# Patient Record
Sex: MALE | Race: WHITE | NOT HISPANIC OR LATINO | Employment: UNEMPLOYED | URBAN - METROPOLITAN AREA
[De-identification: names, ages, dates, MRNs, and addresses within clinical notes are randomized per-mention and may not be internally consistent; named-entity substitution may affect disease eponyms.]

---

## 2023-01-01 ENCOUNTER — NURSING HOME VISIT (OUTPATIENT)
Dept: POST ACUTE CARE | Facility: EXTERNAL LOCATION | Age: 63
End: 2023-01-01
Payer: COMMERCIAL

## 2023-01-01 ENCOUNTER — HOSPITAL ENCOUNTER (EMERGENCY)
Facility: HOSPITAL | Age: 63
Discharge: NURSING, REHAB OR LONG-TERM CARE | End: 2023-09-30
Attending: EMERGENCY MEDICINE | Admitting: EMERGENCY MEDICINE
Payer: COMMERCIAL

## 2023-01-01 ENCOUNTER — HOSPITAL ENCOUNTER (OUTPATIENT)
Dept: DATA CONVERSION | Facility: HOSPITAL | Age: 63
Discharge: HOME | End: 2023-09-29
Payer: COMMERCIAL

## 2023-01-01 VITALS
RESPIRATION RATE: 18 BRPM | HEART RATE: 74 BPM | DIASTOLIC BLOOD PRESSURE: 68 MMHG | TEMPERATURE: 98 F | SYSTOLIC BLOOD PRESSURE: 116 MMHG

## 2023-01-01 DIAGNOSIS — D64.9 ANEMIA, UNSPECIFIED TYPE: ICD-10-CM

## 2023-01-01 DIAGNOSIS — D70.8 OTHER NEUTROPENIA (CMS-HCC): ICD-10-CM

## 2023-01-01 DIAGNOSIS — D61.818 PANCYTOPENIA (MULTI): ICD-10-CM

## 2023-01-01 DIAGNOSIS — E46 MALNUTRITION, UNSPECIFIED TYPE (MULTI): ICD-10-CM

## 2023-01-01 DIAGNOSIS — U07.1 COVID-19: Primary | ICD-10-CM

## 2023-01-01 DIAGNOSIS — N18.9 CHRONIC KIDNEY DISEASE, UNSPECIFIED CKD STAGE: ICD-10-CM

## 2023-01-01 DIAGNOSIS — Z91.81 AT RISK FOR FALLS: ICD-10-CM

## 2023-01-01 DIAGNOSIS — U07.1 COVID: ICD-10-CM

## 2023-01-01 DIAGNOSIS — N39.0 UTI (URINARY TRACT INFECTION) WITH PYURIA: ICD-10-CM

## 2023-01-01 DIAGNOSIS — G30.9 MODERATE ALZHEIMER'S DEMENTIA WITHOUT BEHAVIORAL DISTURBANCE, PSYCHOTIC DISTURBANCE, MOOD DISTURBANCE, OR ANXIETY, UNSPECIFIED TIMING OF DEMENTIA ONSET (MULTI): ICD-10-CM

## 2023-01-01 DIAGNOSIS — C20 RECTAL CARCINOMA (MULTI): ICD-10-CM

## 2023-01-01 DIAGNOSIS — N39.0 URINARY TRACT INFECTION WITHOUT HEMATURIA, SITE UNSPECIFIED: Primary | ICD-10-CM

## 2023-01-01 DIAGNOSIS — F02.B0 MODERATE ALZHEIMER'S DEMENTIA WITHOUT BEHAVIORAL DISTURBANCE, PSYCHOTIC DISTURBANCE, MOOD DISTURBANCE, OR ANXIETY, UNSPECIFIED TIMING OF DEMENTIA ONSET (MULTI): ICD-10-CM

## 2023-01-01 DIAGNOSIS — R53.1 ASTHENIA: ICD-10-CM

## 2023-01-01 DIAGNOSIS — N12 PYELONEPHRITIS: Primary | ICD-10-CM

## 2023-01-01 DIAGNOSIS — C18.9 MALIGNANT NEOPLASM OF COLON, UNSPECIFIED PART OF COLON (MULTI): ICD-10-CM

## 2023-01-01 DIAGNOSIS — A41.9 SEPSIS, DUE TO UNSPECIFIED ORGANISM, UNSPECIFIED WHETHER ACUTE ORGAN DYSFUNCTION PRESENT (MULTI): ICD-10-CM

## 2023-01-01 DIAGNOSIS — Z43.3 COLOSTOMY CARE (MULTI): ICD-10-CM

## 2023-01-01 DIAGNOSIS — R53.1 WEAKNESS: ICD-10-CM

## 2023-01-01 LAB
BACTERIA ISLT: NORMAL
BACTERIA UR QL AUTO: ABNORMAL
BILIRUB UR QL STRIP.AUTO: NEGATIVE
CEFEPIME SUSC ISLT: NORMAL
CEFTAZIDIME SUSC ISLT: NORMAL
CIPROFLOXACIN SUSC ISLT: NORMAL
CLARITY UR: ABNORMAL
COLOR UR: YELLOW
EPITH CASTS #/AREA UR COMP ASSIST: ABNORMAL /HPF
GENTAMICIN ISLT MLC: NORMAL
GLUCOSE UR STRIP.AUTO-MCNC: NEGATIVE MG/DL
HGB UR QL STRIP.AUTO: ABNORMAL /HPF (ref 0–3)
HGB UR QL: ABNORMAL
KETONES UR QL STRIP.AUTO: ABNORMAL
LEUKOCYTE ESTERASE UR QL STRIP.AUTO: ABNORMAL
LEVOFLOXACIN SUSC ISLT: NORMAL
MEROPENEM SUSC ISLT: NORMAL
MIC (SUSCEPTIBILITY): NORMAL
MICROSCOPIC (UA): ABNORMAL
NITRITE UR QL STRIP.AUTO: NEGATIVE
NOTE (COV): ABNORMAL
PH UR STRIP.AUTO: 8.5 [PH] (ref 4.6–8)
PIP+TAZO SUSC ISLT: NORMAL
PROT UR STRIP.AUTO-MCNC: >=500 MG/DL
SARS-COV-2 RNA RESP QL NAA+PROBE: ABNORMAL
SP GR UR STRIP.AUTO: 1.01 (ref 1–1.03)
SPECIMEN SOURCE (COV): ABNORMAL
URINE CULTURE: ABNORMAL
UROBILINOGEN UR QL STRIP.AUTO: NEGATIVE MG/DL (ref 0–1)
WBC #/AREA URNS AUTO: ABNORMAL /HPF (ref 0–3)

## 2023-01-01 PROCEDURE — 99284 EMERGENCY DEPT VISIT MOD MDM: CPT

## 2023-01-01 PROCEDURE — 99308 SBSQ NF CARE LOW MDM 20: CPT | Performed by: INTERNAL MEDICINE

## 2023-01-01 PROCEDURE — 99306 1ST NF CARE HIGH MDM 50: CPT | Performed by: INTERNAL MEDICINE

## 2023-01-01 PROCEDURE — 96374 THER/PROPH/DIAG INJ IV PUSH: CPT

## 2023-01-01 ASSESSMENT — ENCOUNTER SYMPTOMS
FEVER: 0
CHILLS: 0

## 2023-08-26 NOTE — LETTER
Patient: Octavio Santos  : 1960    Encounter Date: 2023    NAME OF THE PATIENT: Octavio Santos    YOB: 1960    PLACE OF SERVICE:  [facility]    This is new/initial history and physical.    HPI: Mr. Octavio Santos is a 63-year-old male with history of urinary tract infection with sepsis.  He suffers from bladder and colon carcinoma and is undergoing chemotherapy.  He suffers from weakness and deconditioning.  He is unable to care for himself.  He requires supportive care.    PAST MEDICAL HISTORY:  As per nursing home list.  CKD, colon cancer, bladder cancer, UTI, sepsis, weakness, fall risk, anemia, colovesical fistula, and rectal carcinoma.    CURRENT MEDICATIONS:  As per nursing home list.  Medications reviewed.    ALLERGIES:  As per nursing home list.  Codeine.    SOCIAL HISTORY:  As per nursing home list.  Positive history of tobacco abuse.    FAMILY HISTORY:  As per nursing home list.  The patient denies any history of cancer within his family.    REVIEW OF SYSTEMS:  All 12 systems reviewed and pertaining covered in history and physical, the rest are negative.  The patient denies any fevers, chills, or chest pain at this time.    PHYSICAL EXAMINATION  GENERAL: This is a well-developed, well-nourished male, lying in bed, appearing weak and lethargic.  VITAL SIGNS:  As recorded and reviewed from EMR.  Blood pressure 118/70.  Pulse 78.  Respirations 18.  Temperature 98.2.  EYES:  The patient's sclerae white.  The pupils were equal and round.  ENT:  The patient's external ears were normal and the otoscopic examination was negative.  NECK:  Supple.  There were no palpable masses.  Thyroid was not enlarged and there were no carotid bruits.  RESPIRATORY:  The patient had normal inspirations and expirations.  The breath sounds were equal bilaterally and clear to auscultation.  CARDIOVASCULAR:  The patient had S1 normal, split S2 without obvious rubs, clicks, or  murmurs.  GASTROINTESTINAL:  There was no hepatosplenomegaly.  There were no palpable masses and no inguinal nodes.  LYMPHATIC:  The patient had no axillary, groin, or lymphadenopathy.  MUSCULOSKELETAL:  The patient had normal gait.  The joints appeared to be normal without evidence of deformity.  Grossly the muscles had good range of motion and were without effusion.  EXTREMITIES:  There was no evidence of peripheral edema.  NEUROLOGIC:  The patient had normal cranial nerves.  The reflexes, sensory, and motor examination were grossly within normal limits.  PSYCHIATRIC: The patient had normal judgment and insight.   The patient was oriented to person, place, and time, and had no obvious mood defect including depression, anxiety, and/or agitation.    LAB WORK: Laboratory studies were reviewed.    ASSESSMENT AND PLAN:  Urinary tract infection, on antibiotic.  Recent sepsis, continue to monitor.  Colon with bladder cancer, on chemotherapy.  Weakness, on PT and OT.  Fall risk, on fall precaution.  Anemia, follow CBC.  CKD, monitor BMP.  Pancytopenia with neutropenic precautions.  Medication list reviewed and discussed with the family.  Hospital chart reviewed.      Electronically Signed By: Lennox Benson MD   9/26/23  6:23 PM

## 2023-09-02 NOTE — LETTER
Patient: Octavio Santos  : 1960    Encounter Date: 2023    NAME OF THE PATIENT: Octavio Santos    YOB: 1960    PLACE OF SERVICE:  [facility]    This is a subsequent visit.    HPI:  Mr. Octavio Santos is a 63-year-old male with recent history of urinary tract infection with pyelonephritis.  He has a history of bladder cancer and does have a colostomy.  He is unable to care for himself and requires supportive care.    PAST MEDICAL HISTORY:  As per nursing home list.  CKD, colon cancer, bladder cancer, UTI, sepsis, weakness, fall risk, anemia, colovesical fistula, and rectal carcinoma.    CURRENT MEDICATIONS:  As per nursing home list.  Medications reviewed.    ALLERGIES:  As per nursing home list.  Codeine.    SOCIAL HISTORY:  As per nursing home list.  Positive history of tobacco abuse.    FAMILY HISTORY:  As per nursing home list.  The patient denies any history of cancer within his family.    REVIEW OF SYSTEMS:  All 12 systems reviewed and pertaining covered in history and physical, the rest are negative.  The patient denies any fevers, chills, or chest pain at this time.    PHYSICAL EXAMINATION  GENERAL: This is a well-developed, well-nourished male, sitting in a chair, in no acute distress.  VITAL SIGNS:  As recorded and reviewed from EMR.  Blood pressure 120/72.  Pulse 76.  Respirations 16.  Temperature 97.8.  EYES:  The patient's sclerae white.  The pupils were equal and round.  ENT:  The patient's external ears were normal and the otoscopic examination was negative.  NECK:  Supple.  There were no palpable masses.  Thyroid was not enlarged and there were no carotid bruits.  RESPIRATORY:  The patient had normal inspirations and expirations.  The breath sounds were equal bilaterally and clear to auscultation.  CARDIOVASCULAR:  The patient had S1 normal, split S2 without obvious rubs, clicks, or murmurs.  GASTROINTESTINAL:  Abdomen shows colostomy in  place.  It is viable and functioning.  LYMPHATIC:  The patient had no axillary, groin, or lymphadenopathy.  MUSCULOSKELETAL:  The patient had normal gait.  The joints appeared to be normal without evidence of deformity.  Grossly the muscles had good range of motion and were without effusion.  EXTREMITIES:  There was no evidence of peripheral edema.  NEUROLOGIC:  The patient had normal cranial nerves.  The reflexes, sensory, and motor examination were grossly within normal limits.  PSYCHIATRIC: The patient had normal judgment and insight.  The patient was oriented to person, place, and time, and had no obvious mood defect including depression, anxiety, and/or agitation.    LAB WORK: Laboratory studies were reviewed from prior visit.    ASSESSMENT AND PLAN:  Pyelonephritis, on antibiotic.  Colostomy, on colostomy care.  Weakness, on PT/OT.  Fall risk, on fall precaution.  Recent COVID-19, stable.  Anemia, follow CBC.  CKD, monitor BMP.  Rectal carcinoma, continue to monitor.  Malnutrition, encouraged to eat.  Neutropenia, on reverse isolation.  Medication list reviewed and discussed with the family.  Hospital chart reviewed.      Electronically Signed By: Lennox Benson MD   9/26/23  6:22 PM

## 2023-09-26 PROBLEM — N39.0 URINARY TRACT INFECTION WITHOUT HEMATURIA: Status: ACTIVE | Noted: 2023-01-01

## 2023-09-26 PROBLEM — U07.1 COVID: Status: ACTIVE | Noted: 2023-01-01

## 2023-09-26 PROBLEM — R53.1 ASTHENIA: Status: ACTIVE | Noted: 2023-01-01

## 2023-09-26 PROBLEM — Z91.81 AT RISK FOR FALLS: Status: ACTIVE | Noted: 2023-01-01

## 2023-09-26 PROBLEM — N12 PYELONEPHRITIS: Status: ACTIVE | Noted: 2023-01-01

## 2023-09-26 PROBLEM — C20 RECTAL CARCINOMA (MULTI): Status: ACTIVE | Noted: 2023-01-01

## 2023-09-26 PROBLEM — A41.9 SEPSIS (MULTI): Status: ACTIVE | Noted: 2023-01-01

## 2023-09-26 PROBLEM — E46 MALNUTRITION (MULTI): Status: ACTIVE | Noted: 2023-01-01

## 2023-09-26 PROBLEM — N18.9 CHRONIC KIDNEY DISEASE: Status: ACTIVE | Noted: 2023-01-01

## 2023-09-26 PROBLEM — D70.8 OTHER NEUTROPENIA (CMS-HCC): Status: ACTIVE | Noted: 2023-01-01

## 2023-09-26 PROBLEM — D64.9 ANEMIA: Status: ACTIVE | Noted: 2023-01-01

## 2023-09-26 NOTE — PROGRESS NOTES
NAME OF THE PATIENT: Octavio Santos    YOB: 1960    PLACE OF SERVICE:  [facility]    This is a subsequent visit.    HPI:  Mr. Octavio Santos is a 63-year-old male with recent history of urinary tract infection with pyelonephritis.  He has a history of bladder cancer and does have a colostomy.  He is unable to care for himself and requires supportive care.    PAST MEDICAL HISTORY:  As per nursing home list.  CKD, colon cancer, bladder cancer, UTI, sepsis, weakness, fall risk, anemia, colovesical fistula, and rectal carcinoma.    CURRENT MEDICATIONS:  As per nursing home list.  Medications reviewed.    ALLERGIES:  As per nursing home list.  Codeine.    SOCIAL HISTORY:  As per nursing home list.  Positive history of tobacco abuse.    FAMILY HISTORY:  As per nursing home list.  The patient denies any history of cancer within his family.    REVIEW OF SYSTEMS:  All 12 systems reviewed and pertaining covered in history and physical, the rest are negative.  The patient denies any fevers, chills, or chest pain at this time.    PHYSICAL EXAMINATION  GENERAL: This is a well-developed, well-nourished male, sitting in a chair, in no acute distress.  VITAL SIGNS:  As recorded and reviewed from EMR.  Blood pressure 120/72.  Pulse 76.  Respirations 16.  Temperature 97.8.  EYES:  The patient's sclerae white.  The pupils were equal and round.  ENT:  The patient's external ears were normal and the otoscopic examination was negative.  NECK:  Supple.  There were no palpable masses.  Thyroid was not enlarged and there were no carotid bruits.  RESPIRATORY:  The patient had normal inspirations and expirations.  The breath sounds were equal bilaterally and clear to auscultation.  CARDIOVASCULAR:  The patient had S1 normal, split S2 without obvious rubs, clicks, or murmurs.  GASTROINTESTINAL:  Abdomen shows colostomy in place.  It is viable and functioning.  LYMPHATIC:  The patient had no  axillary, groin, or lymphadenopathy.  MUSCULOSKELETAL:  The patient had normal gait.  The joints appeared to be normal without evidence of deformity.  Grossly the muscles had good range of motion and were without effusion.  EXTREMITIES:  There was no evidence of peripheral edema.  NEUROLOGIC:  The patient had normal cranial nerves.  The reflexes, sensory, and motor examination were grossly within normal limits.  PSYCHIATRIC: The patient had normal judgment and insight.  The patient was oriented to person, place, and time, and had no obvious mood defect including depression, anxiety, and/or agitation.    LAB WORK: Laboratory studies were reviewed from prior visit.    ASSESSMENT AND PLAN:  Pyelonephritis, on antibiotic.  Colostomy, on colostomy care.  Weakness, on PT/OT.  Fall risk, on fall precaution.  Recent COVID-19, stable.  Anemia, follow CBC.  CKD, monitor BMP.  Rectal carcinoma, continue to monitor.  Malnutrition, encouraged to eat.  Neutropenia, on reverse isolation.  Medication list reviewed and discussed with the family.  Hospital chart reviewed.

## 2023-09-26 NOTE — PROGRESS NOTES
NAME OF THE PATIENT: Octavio Santos    YOB: 1960    PLACE OF SERVICE:  [facility]    This is new/initial history and physical.    HPI: Mr. Octavio Santos is a 63-year-old male with history of urinary tract infection with sepsis.  He suffers from bladder and colon carcinoma and is undergoing chemotherapy.  He suffers from weakness and deconditioning.  He is unable to care for himself.  He requires supportive care.    PAST MEDICAL HISTORY:  As per nursing home list.  CKD, colon cancer, bladder cancer, UTI, sepsis, weakness, fall risk, anemia, colovesical fistula, and rectal carcinoma.    CURRENT MEDICATIONS:  As per nursing home list.  Medications reviewed.    ALLERGIES:  As per nursing home list.  Codeine.    SOCIAL HISTORY:  As per nursing home list.  Positive history of tobacco abuse.    FAMILY HISTORY:  As per nursing home list.  The patient denies any history of cancer within his family.    REVIEW OF SYSTEMS:  All 12 systems reviewed and pertaining covered in history and physical, the rest are negative.  The patient denies any fevers, chills, or chest pain at this time.    PHYSICAL EXAMINATION  GENERAL: This is a well-developed, well-nourished male, lying in bed, appearing weak and lethargic.  VITAL SIGNS:  As recorded and reviewed from EMR.  Blood pressure 118/70.  Pulse 78.  Respirations 18.  Temperature 98.2.  EYES:  The patient's sclerae white.  The pupils were equal and round.  ENT:  The patient's external ears were normal and the otoscopic examination was negative.  NECK:  Supple.  There were no palpable masses.  Thyroid was not enlarged and there were no carotid bruits.  RESPIRATORY:  The patient had normal inspirations and expirations.  The breath sounds were equal bilaterally and clear to auscultation.  CARDIOVASCULAR:  The patient had S1 normal, split S2 without obvious rubs, clicks, or murmurs.  GASTROINTESTINAL:  There was no hepatosplenomegaly.  There were no  palpable masses and no inguinal nodes.  LYMPHATIC:  The patient had no axillary, groin, or lymphadenopathy.  MUSCULOSKELETAL:  The patient had normal gait.  The joints appeared to be normal without evidence of deformity.  Grossly the muscles had good range of motion and were without effusion.  EXTREMITIES:  There was no evidence of peripheral edema.  NEUROLOGIC:  The patient had normal cranial nerves.  The reflexes, sensory, and motor examination were grossly within normal limits.  PSYCHIATRIC: The patient had normal judgment and insight.   The patient was oriented to person, place, and time, and had no obvious mood defect including depression, anxiety, and/or agitation.    LAB WORK: Laboratory studies were reviewed.    ASSESSMENT AND PLAN:  Urinary tract infection, on antibiotic.  Recent sepsis, continue to monitor.  Colon with bladder cancer, on chemotherapy.  Weakness, on PT and OT.  Fall risk, on fall precaution.  Anemia, follow CBC.  CKD, monitor BMP.  Pancytopenia with neutropenic precautions.  Medication list reviewed and discussed with the family.  Hospital chart reviewed.

## 2023-10-01 NOTE — LETTER
Patient: Octavio Santos  : 1960    Encounter Date: 10/01/2023    Subjective  Patient ID: Octavio Santos is a 63 y.o. male who presents for Follow-up.    Mr. Octavio Santos is a 63-year-old male with history of pyelonephritis.  He suffers from rectal carcinoma and currently has a colostomy.  He is malnourished and unable to care for himself.  He requires supportive care.    ALLERGIES:  Allergy to codeine.    Review of Systems   Constitutional:  Negative for chills and fever.   Cardiovascular:  Negative for chest pain.   All other systems reviewed and are negative.    Objective  /68   Pulse 74   Temp 36.7 °C (98 °F)   Resp 18     Physical Exam  Vitals reviewed.   Constitutional:       Comments: This is a well-developed and well-nourished male, lying in bed, appearing weak.   HENT:      Right Ear: Tympanic membrane, ear canal and external ear normal.      Left Ear: Tympanic membrane, ear canal and external ear normal.   Eyes:      General: No scleral icterus.     Pupils: Pupils are equal, round, and reactive to light.   Neck:      Vascular: No carotid bruit.   Cardiovascular:      Heart sounds: Normal heart sounds, S1 normal and S2 normal. No murmur heard.     No friction rub.   Pulmonary:      Effort: Pulmonary effort is normal.      Breath sounds: Normal breath sounds and air entry.   Abdominal:      Palpations: There is no hepatomegaly, splenomegaly or mass.      Comments: Abdomen shows colostomy.  It is viable and functioning.   Musculoskeletal:         General: No swelling or deformity. Normal range of motion.      Cervical back: Neck supple.      Right lower leg: No edema.      Left lower leg: No edema.   Lymphadenopathy:      Cervical: No cervical adenopathy.      Upper Body:      Right upper body: No axillary adenopathy.      Left upper body: No axillary adenopathy.      Lower Body: No right inguinal adenopathy. No left inguinal adenopathy.   Neurological:      Mental Status: He is  oriented to person, place, and time. He is lethargic.      Cranial Nerves: Cranial nerves 2-12 are intact. No cranial nerve deficit.      Sensory: No sensory deficit.      Motor: Motor function is intact. No weakness.      Gait: Gait is intact.      Deep Tendon Reflexes: Reflexes normal.   Psychiatric:         Mood and Affect: Mood normal. Mood is not anxious or depressed. Affect is not angry.         Behavior: Behavior is not agitated.         Thought Content: Thought content normal.         Judgment: Judgment normal.     LAB WORK: Laboratory studies were reviewed.    Assessment/Plan  Problem List Items Addressed This Visit             ICD-10-CM       Advance Directives and General Issues    At risk for falls Z91.81       Endocrine/Metabolic    Malnutrition (CMS/[facility]) E46       Genitourinary and Reproductive    Chronic kidney disease N18.9       Hematology and Neoplasia    Anemia D64.9       Infectious Diseases    Pyelonephritis - Primary N12    COVID U07.1     Other Visit Diagnoses         Codes    Malignant neoplasm of colon, unspecified part of colon (CMS/[facility])     C18.9    Colostomy care (CMS/H[facility])     Z43.3    Weakness     R53.1        1. Pyelonephritis, on antibiotic.  2. Colon cancer, on chemotherapy.  3. Colostomy, with colostomy care.  4. CKD, monitor BMP.  5. Malnutrition, encouraged to eat.  6. Weakness, on PT/OT.  7. Fall risk, on fall precaution.  8. Anemia, follow CBC.  9. COVID-19, stable.    Scribe Attestation  By signing my name below, ICathryn, Scrdamaris attest that this documentation has been prepared under the direction and in the presence of Lennox Benson MD.       Electronically Signed By: Lennox Benson MD   10/31/23  3:53 PM

## 2023-10-07 NOTE — PROGRESS NOTES
Subjective   Patient ID: Octavio Santos is a 63 y.o. male who presents for Follow-up.    Mr. Octavio Santos is a 63-year-old male with history of pyelonephritis.  He suffers from rectal carcinoma and currently has a colostomy.  He is malnourished and unable to care for himself.  He requires supportive care.    ALLERGIES:  Allergy to codeine.    Review of Systems   Constitutional:  Negative for chills and fever.   Cardiovascular:  Negative for chest pain.   All other systems reviewed and are negative.    Objective   /68   Pulse 74   Temp 36.7 °C (98 °F)   Resp 18     Physical Exam  Vitals reviewed.   Constitutional:       Comments: This is a well-developed and well-nourished male, lying in bed, appearing weak.   HENT:      Right Ear: Tympanic membrane, ear canal and external ear normal.      Left Ear: Tympanic membrane, ear canal and external ear normal.   Eyes:      General: No scleral icterus.     Pupils: Pupils are equal, round, and reactive to light.   Neck:      Vascular: No carotid bruit.   Cardiovascular:      Heart sounds: Normal heart sounds, S1 normal and S2 normal. No murmur heard.     No friction rub.   Pulmonary:      Effort: Pulmonary effort is normal.      Breath sounds: Normal breath sounds and air entry.   Abdominal:      Palpations: There is no hepatomegaly, splenomegaly or mass.      Comments: Abdomen shows colostomy.  It is viable and functioning.   Musculoskeletal:         General: No swelling or deformity. Normal range of motion.      Cervical back: Neck supple.      Right lower leg: No edema.      Left lower leg: No edema.   Lymphadenopathy:      Cervical: No cervical adenopathy.      Upper Body:      Right upper body: No axillary adenopathy.      Left upper body: No axillary adenopathy.      Lower Body: No right inguinal adenopathy. No left inguinal adenopathy.   Neurological:      Mental Status: He is oriented to person, place, and time. He is lethargic.      Cranial Nerves:  Cranial nerves 2-12 are intact. No cranial nerve deficit.      Sensory: No sensory deficit.      Motor: Motor function is intact. No weakness.      Gait: Gait is intact.      Deep Tendon Reflexes: Reflexes normal.   Psychiatric:         Mood and Affect: Mood normal. Mood is not anxious or depressed. Affect is not angry.         Behavior: Behavior is not agitated.         Thought Content: Thought content normal.         Judgment: Judgment normal.     LAB WORK: Laboratory studies were reviewed.    Assessment/Plan   Problem List Items Addressed This Visit             ICD-10-CM       Advance Directives and General Issues    At risk for falls Z91.81       Endocrine/Metabolic    Malnutrition (CMS/[facility]) E46       Genitourinary and Reproductive    Chronic kidney disease N18.9       Hematology and Neoplasia    Anemia D64.9       Infectious Diseases    Pyelonephritis - Primary N12    COVID U07.1     Other Visit Diagnoses         Codes    Malignant neoplasm of colon, unspecified part of colon (CMS/[facility])     C18.9    Colostomy care (CMS/[facility])     Z43.3    Weakness     R53.1        1. Pyelonephritis, on antibiotic.  2. Colon cancer, on chemotherapy.  3. Colostomy, with colostomy care.  4. CKD, monitor BMP.  5. Malnutrition, encouraged to eat.  6. Weakness, on PT/OT.  7. Fall risk, on fall precaution.  8. Anemia, follow CBC.  9. COVID-19, stable.    Scribe Attestation  By signing my name below, ICathryn Scribe attest that this documentation All medical record entries made by the scribe were personally dictated by me I have reviewed the chart and agree the record accurately reflects my personal performance of his history physical examination and management  has been prepared under the direction and in the presence of Lennox Benson MD.

## 2023-10-16 NOTE — ED PROVIDER NOTES
HPI   No chief complaint on file.      64 yo m with hx reviewed below is BIBEMS with c  of pain all over. Pt is in hospice and called 911 himself wanting an eval in the ED. He has a hx of dementia and cancer and recently revised advanced directives. He has no specific complaints, but just feels terrible.       History provided by:  Patient and EMS personnel  History limited by:  Dementia   used: No                        No data recorded                Patient History   Past Medical History:   Diagnosis Date    Anemia     Bladder cancer (CMS/HCC)     CKD (chronic kidney disease)     Colostomy care (CMS/[facility])     COVID     Fistula     Malnutrition (CMS/HCC)     Pyelonephritis     Rectal carcinoma (CMS/[facility])     Urinary tract infection      Past Surgical History:   Procedure Laterality Date    COLOSTOMY       Family History   Problem Relation Name Age of Onset    Cancer Other       Social History     Tobacco Use    Smoking status: Every Day     Types: Cigarettes    Smokeless tobacco: Not on file   Substance Use Topics    Alcohol use: Not on file    Drug use: Not on file       Physical Exam   ED Triage Vitals   Temp Pulse Resp BP   -- -- -- --      SpO2 Temp src Heart Rate Source Patient Position   -- -- -- --      BP Location FiO2 (%)     -- --       Physical Exam  Vitals and nursing note reviewed.   Constitutional:       General: He is not in acute distress.     Appearance: He is well-developed.   HENT:      Head: Normocephalic and atraumatic.   Eyes:      Conjunctiva/sclera: Conjunctivae normal.   Cardiovascular:      Rate and Rhythm: Normal rate and regular rhythm.      Heart sounds: No murmur heard.  Pulmonary:      Effort: Pulmonary effort is normal. No respiratory distress.      Breath sounds: Normal breath sounds.   Abdominal:      Palpations: Abdomen is soft.      Tenderness: There is no abdominal tenderness.   Musculoskeletal:         General: No swelling.      Cervical back: Neck supple.    Skin:     General: Skin is warm and dry.      Capillary Refill: Capillary refill takes less than 2 seconds.   Neurological:      Mental Status: He is alert.   Psychiatric:         Mood and Affect: Mood normal.         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  Review of systems: Total of 10 systems reviewed and otherwise negative except as noted elsewhere    DDX: As described in MDM    If performed, radiology listed above interpreted by me and confirmed by the Radiologist.  Medications administered during this visit (name and route): see MAR  Social determinants of health considered for this visit: lives in a SNF being transferred to hospice.   If performed, EKG interpreted by me and detailed above    MDM Summary/considerations:  64 yo m with generalized fatigue and myalgias requesting ED eval. After nursing staff confirmed advanced directives with the pt's POA, he was evaluated conservatively with CXR, UA and viral testing. He is positive for COVID, but SNF aware already. CXR shows no acute process. UA with evidence of UTI. VS WNL and pt is not septic. Pt will be transferred back to SNF with cephalexin for uti.     Prescriptions provided include: ###    The patient was seen and triaged by our nursing/medic staff, their vitals were taken and the staff notes were reviewed.  If the patient arrived by an EMS squad or an outside agency, we discussed the case with transporting EMS medic, police, or other historians. My initial assessment was attention to their airway, breathing, and circulatory status.  We addressed any immediate or life threatening findings and completed a  medical history and a physical exam if the patient or those legally responsible were in agreement with this.   Prior to the patient being discharged, I or my PA/NP or the nursing staff discussed the differential, results and discharge plan with the patient and/or family/friend/caregiver if present.  I emphasized the importance of follow-up in 2-3 days unless otherwise specified.  I explained reasons for the patient to return to the Emergency Department. Additional verbal discharge instructions were also given and discussed with the patient to supplement those generated by the EMR. We also discussed medications that were prescribed (if any) including common side effects and interactions. The patient was advised to abstain from driving, operating heavy machinery or making significant decisions while taking medications such as antihistamines, benzodiazepines, opiates and muscle relaxers. All questions were addressed.  They understand return precautions and discharge instructions. The patient and/or family/friend/caregiver expressed understanding.  **Disclaimer:  This note was dictated by speech recognition technology.  Minor errors in transcription may be present.  Please contact for clarification or corrections.    In the case the patient eloped or refused treatment/admission, we offered to the best of our ability to provide care to the patient at the time of this encounter.        Procedure  Procedures     Akilah Hernandez MD  10/16/23 4140